# Patient Record
Sex: MALE | Race: BLACK OR AFRICAN AMERICAN | NOT HISPANIC OR LATINO | URBAN - METROPOLITAN AREA
[De-identification: names, ages, dates, MRNs, and addresses within clinical notes are randomized per-mention and may not be internally consistent; named-entity substitution may affect disease eponyms.]

---

## 2017-05-25 ENCOUNTER — EMERGENCY (EMERGENCY)
Facility: HOSPITAL | Age: 27
LOS: 1 days | Discharge: HOME | End: 2017-05-25
Admitting: EMERGENCY MEDICINE
Payer: MEDICAID

## 2017-05-25 VITALS
RESPIRATION RATE: 14 BRPM | WEIGHT: 220.02 LBS | HEIGHT: 75 IN | OXYGEN SATURATION: 98 % | HEART RATE: 92 BPM | TEMPERATURE: 99 F | SYSTOLIC BLOOD PRESSURE: 140 MMHG | DIASTOLIC BLOOD PRESSURE: 98 MMHG

## 2017-05-25 LAB
HIV1 AG SER QL: SIGNIFICANT CHANGE UP
HIV1+2 AB SPEC QL: SIGNIFICANT CHANGE UP

## 2017-05-25 PROCEDURE — 99282 EMERGENCY DEPT VISIT SF MDM: CPT

## 2017-05-25 NOTE — ED PROVIDER NOTE - PROGRESS NOTE DETAILS
HODA Baez; advised patient to see ENT for chronic symptoms may be related to reflux. pt agreees and understands plan. return precautions given.

## 2017-05-25 NOTE — ED PROVIDER NOTE - CARE PLAN
Principal Discharge DX:	Mouth pain  Instructions for follow-up, activity and diet:	Rest, drink plenty of fluids.  Advance activity as tolerated.  Continue all previously prescribed medications as directed. Follow up with PA Admin in 48 hours for test results. [phone]. Follow up with your primary care physician in 48-72 hours- bring copies of your results. Follow up with ENT referral list provided.  Return to the Emergency Department for worsening or persistent symptoms OR ANY NEW OR CONCERNING SYMPTOMS. Principal Discharge DX:	Mouth pain  Instructions for follow-up, activity and diet:	Rest, drink plenty of fluids.  Advance activity as tolerated.  Continue all previously prescribed medications as directed. Follow up with PA Admin in 48 hours for test results. [phone]. Follow up with your primary care physician in 48-72 hours- bring copies of your results. Follow up with ENT referral list provided.  Return to the Emergency Department for worsening or persistent symptoms OR ANY NEW OR CONCERNING SYMPTOMS. Principal Discharge DX:	Mouth pain  Instructions for follow-up, activity and diet:	Rest, drink plenty of fluids.  Advance activity as tolerated.  Continue all previously prescribed medications as directed. Follow up with PA Admin in 48 hours for test results. [phone]. Follow up with your primary care physician in 48-72 hours- bring copies of your results. Follow up with ENT referral list provided.  Return to the Emergency Department for worsening or persistent symptoms OR ANY NEW OR CONCERNING SYMPTOMS.

## 2017-05-25 NOTE — ED PROVIDER NOTE - PHYSICAL EXAMINATION
no vesicles, no tonsillar swelling  no white adhesions, no ulcerations, uvula midline  speaking in full sentences , no drooling no difficulty speaking or swallowing

## 2017-05-25 NOTE — ED PROVIDER NOTE - OBJECTIVE STATEMENT
26 y.o male no pmhx 26 y.o male no pmhx here with complaints of mouth tingling for 4 days with months of feeling a sensation when swallowing. 26 y.o male no pmhx here with complaints of mouth tingling/burning for 4 days with months of feeling a sensation when swallowing. Denies fevers, chills, chest pain, SOB, cough, nausea, vomiting, diarrhea, numbness, weakness, urinary symptoms.

## 2017-05-25 NOTE — ED PROVIDER NOTE - PLAN OF CARE
Rest, drink plenty of fluids.  Advance activity as tolerated.  Continue all previously prescribed medications as directed. Follow up with PA Admin in 48 hours for test results. [phone]. Follow up with your primary care physician in 48-72 hours- bring copies of your results. Follow up with ENT referral list provided.  Return to the Emergency Department for worsening or persistent symptoms OR ANY NEW OR CONCERNING SYMPTOMS.

## 2017-05-25 NOTE — ED PROVIDER NOTE - MEDICAL DECISION MAKING DETAILS
26 y.o male no pmhx with normal exam no signs of thrush /lesions or ulcers. offered HIV testing, accepted. Pt to follow up outpatient with PMD and ENT.

## 2020-04-20 ENCOUNTER — INPATIENT (INPATIENT)
Facility: HOSPITAL | Age: 30
LOS: 4 days | Discharge: HOME | End: 2020-04-25
Attending: INTERNAL MEDICINE | Admitting: INTERNAL MEDICINE
Payer: SELF-PAY

## 2020-04-20 VITALS
SYSTOLIC BLOOD PRESSURE: 143 MMHG | TEMPERATURE: 97 F | WEIGHT: 220.02 LBS | OXYGEN SATURATION: 93 % | HEART RATE: 135 BPM | DIASTOLIC BLOOD PRESSURE: 89 MMHG | RESPIRATION RATE: 20 BRPM | HEIGHT: 74 IN

## 2020-04-20 DIAGNOSIS — R56.9 UNSPECIFIED CONVULSIONS: ICD-10-CM

## 2020-04-20 DIAGNOSIS — E87.6 HYPOKALEMIA: ICD-10-CM

## 2020-04-20 DIAGNOSIS — F10.230 ALCOHOL DEPENDENCE WITH WITHDRAWAL, UNCOMPLICATED: ICD-10-CM

## 2020-04-20 DIAGNOSIS — E83.39 OTHER DISORDERS OF PHOSPHORUS METABOLISM: ICD-10-CM

## 2020-04-20 LAB
ALBUMIN SERPL ELPH-MCNC: 4.6 G/DL — SIGNIFICANT CHANGE UP (ref 3.3–5)
ALP SERPL-CCNC: 55 U/L — SIGNIFICANT CHANGE UP (ref 40–120)
ALT FLD-CCNC: 193 U/L — HIGH (ref 12–78)
ANION GAP SERPL CALC-SCNC: 22 MMOL/L — HIGH (ref 5–17)
AST SERPL-CCNC: 276 U/L — HIGH (ref 15–37)
BASOPHILS # BLD AUTO: 0.02 K/UL — SIGNIFICANT CHANGE UP (ref 0–0.2)
BASOPHILS NFR BLD AUTO: 0.3 % — SIGNIFICANT CHANGE UP (ref 0–2)
BILIRUB SERPL-MCNC: 2 MG/DL — HIGH (ref 0.2–1.2)
BUN SERPL-MCNC: 10 MG/DL — SIGNIFICANT CHANGE UP (ref 7–23)
CALCIUM SERPL-MCNC: 9.9 MG/DL — SIGNIFICANT CHANGE UP (ref 8.5–10.1)
CHLORIDE SERPL-SCNC: 90 MMOL/L — LOW (ref 96–108)
CO2 SERPL-SCNC: 21 MMOL/L — LOW (ref 22–31)
CREAT SERPL-MCNC: 1.24 MG/DL — SIGNIFICANT CHANGE UP (ref 0.5–1.3)
EOSINOPHIL # BLD AUTO: 0 K/UL — SIGNIFICANT CHANGE UP (ref 0–0.5)
EOSINOPHIL NFR BLD AUTO: 0 % — SIGNIFICANT CHANGE UP (ref 0–6)
ETHANOL SERPL-MCNC: <10 MG/DL — SIGNIFICANT CHANGE UP (ref 0–10)
GLUCOSE BLDC GLUCOMTR-MCNC: 127 MG/DL — HIGH (ref 70–99)
GLUCOSE SERPL-MCNC: 121 MG/DL — HIGH (ref 70–99)
HCT VFR BLD CALC: 43.5 % — SIGNIFICANT CHANGE UP (ref 39–50)
HGB BLD-MCNC: 14.9 G/DL — SIGNIFICANT CHANGE UP (ref 13–17)
IMM GRANULOCYTES NFR BLD AUTO: 1.3 % — SIGNIFICANT CHANGE UP (ref 0–1.5)
LYMPHOCYTES # BLD AUTO: 0.32 K/UL — LOW (ref 1–3.3)
LYMPHOCYTES # BLD AUTO: 4.6 % — LOW (ref 13–44)
MAGNESIUM SERPL-MCNC: 3 MG/DL — HIGH (ref 1.6–2.6)
MCHC RBC-ENTMCNC: 34.3 GM/DL — SIGNIFICANT CHANGE UP (ref 32–36)
MCHC RBC-ENTMCNC: 34.6 PG — HIGH (ref 27–34)
MCV RBC AUTO: 100.9 FL — HIGH (ref 80–100)
MONOCYTES # BLD AUTO: 0.47 K/UL — SIGNIFICANT CHANGE UP (ref 0–0.9)
MONOCYTES NFR BLD AUTO: 6.8 % — SIGNIFICANT CHANGE UP (ref 2–14)
NEUTROPHILS # BLD AUTO: 6.04 K/UL — SIGNIFICANT CHANGE UP (ref 1.8–7.4)
NEUTROPHILS NFR BLD AUTO: 87 % — HIGH (ref 43–77)
NRBC # BLD: 0 /100 WBCS — SIGNIFICANT CHANGE UP (ref 0–0)
PHOSPHATE SERPL-MCNC: 1.3 MG/DL — LOW (ref 2.5–4.5)
PLATELET # BLD AUTO: 135 K/UL — LOW (ref 150–400)
POTASSIUM SERPL-MCNC: 2.8 MMOL/L — CRITICAL LOW (ref 3.5–5.3)
POTASSIUM SERPL-SCNC: 2.8 MMOL/L — CRITICAL LOW (ref 3.5–5.3)
PROT SERPL-MCNC: 8.3 GM/DL — SIGNIFICANT CHANGE UP (ref 6–8.3)
RBC # BLD: 4.31 M/UL — SIGNIFICANT CHANGE UP (ref 4.2–5.8)
RBC # FLD: 12 % — SIGNIFICANT CHANGE UP (ref 10.3–14.5)
SARS-COV-2 RNA SPEC QL NAA+PROBE: SIGNIFICANT CHANGE UP
SODIUM SERPL-SCNC: 133 MMOL/L — LOW (ref 135–145)
VIT B12 SERPL-MCNC: 830 PG/ML — SIGNIFICANT CHANGE UP (ref 232–1245)
WBC # BLD: 6.94 K/UL — SIGNIFICANT CHANGE UP (ref 3.8–10.5)
WBC # FLD AUTO: 6.94 K/UL — SIGNIFICANT CHANGE UP (ref 3.8–10.5)

## 2020-04-20 PROCEDURE — 99223 1ST HOSP IP/OBS HIGH 75: CPT

## 2020-04-20 PROCEDURE — 71045 X-RAY EXAM CHEST 1 VIEW: CPT | Mod: 26

## 2020-04-20 PROCEDURE — 93010 ELECTROCARDIOGRAM REPORT: CPT

## 2020-04-20 PROCEDURE — 99285 EMERGENCY DEPT VISIT HI MDM: CPT

## 2020-04-20 PROCEDURE — 70450 CT HEAD/BRAIN W/O DYE: CPT | Mod: 26

## 2020-04-20 RX ORDER — THIAMINE MONONITRATE (VIT B1) 100 MG
100 TABLET ORAL ONCE
Refills: 0 | Status: COMPLETED | OUTPATIENT
Start: 2020-04-20 | End: 2020-04-20

## 2020-04-20 RX ORDER — DIAZEPAM 5 MG
10 TABLET ORAL ONCE
Refills: 0 | Status: DISCONTINUED | OUTPATIENT
Start: 2020-04-20 | End: 2020-04-20

## 2020-04-20 RX ORDER — POTASSIUM CHLORIDE 20 MEQ
40 PACKET (EA) ORAL ONCE
Refills: 0 | Status: DISCONTINUED | OUTPATIENT
Start: 2020-04-20 | End: 2020-04-20

## 2020-04-20 RX ORDER — SODIUM CHLORIDE 9 MG/ML
1000 INJECTION INTRAMUSCULAR; INTRAVENOUS; SUBCUTANEOUS ONCE
Refills: 0 | Status: COMPLETED | OUTPATIENT
Start: 2020-04-20 | End: 2020-04-20

## 2020-04-20 RX ORDER — SODIUM CHLORIDE 9 MG/ML
1000 INJECTION, SOLUTION INTRAVENOUS ONCE
Refills: 0 | Status: COMPLETED | OUTPATIENT
Start: 2020-04-20 | End: 2020-04-20

## 2020-04-20 RX ORDER — POTASSIUM CHLORIDE 20 MEQ
10 PACKET (EA) ORAL
Refills: 0 | Status: COMPLETED | OUTPATIENT
Start: 2020-04-20 | End: 2020-04-20

## 2020-04-20 RX ORDER — POTASSIUM PHOSPHATE, MONOBASIC POTASSIUM PHOSPHATE, DIBASIC 236; 224 MG/ML; MG/ML
15 INJECTION, SOLUTION INTRAVENOUS ONCE
Refills: 0 | Status: COMPLETED | OUTPATIENT
Start: 2020-04-20 | End: 2020-04-21

## 2020-04-20 RX ORDER — HEPARIN SODIUM 5000 [USP'U]/ML
5000 INJECTION INTRAVENOUS; SUBCUTANEOUS EVERY 12 HOURS
Refills: 0 | Status: DISCONTINUED | OUTPATIENT
Start: 2020-04-20 | End: 2020-04-25

## 2020-04-20 RX ORDER — SODIUM CHLORIDE 9 MG/ML
1000 INJECTION, SOLUTION INTRAVENOUS
Refills: 0 | Status: DISCONTINUED | OUTPATIENT
Start: 2020-04-20 | End: 2020-04-25

## 2020-04-20 RX ORDER — POTASSIUM CHLORIDE 20 MEQ
10 PACKET (EA) ORAL ONCE
Refills: 0 | Status: DISCONTINUED | OUTPATIENT
Start: 2020-04-20 | End: 2020-04-20

## 2020-04-20 RX ORDER — SODIUM,POTASSIUM PHOSPHATES 278-250MG
2 POWDER IN PACKET (EA) ORAL ONCE
Refills: 0 | Status: COMPLETED | OUTPATIENT
Start: 2020-04-20 | End: 2020-04-20

## 2020-04-20 RX ORDER — PANTOPRAZOLE SODIUM 20 MG/1
40 TABLET, DELAYED RELEASE ORAL
Refills: 0 | Status: DISCONTINUED | OUTPATIENT
Start: 2020-04-20 | End: 2020-04-25

## 2020-04-20 RX ADMIN — SODIUM CHLORIDE 1000 MILLILITER(S): 9 INJECTION, SOLUTION INTRAVENOUS at 18:24

## 2020-04-20 RX ADMIN — Medication 2 PACKET(S): at 18:55

## 2020-04-20 RX ADMIN — Medication 100 MILLIEQUIVALENT(S): at 21:16

## 2020-04-20 RX ADMIN — Medication 10 MILLIGRAM(S): at 17:37

## 2020-04-20 RX ADMIN — Medication 100 MILLIEQUIVALENT(S): at 18:55

## 2020-04-20 RX ADMIN — Medication 100 MILLIGRAM(S): at 17:35

## 2020-04-20 RX ADMIN — Medication 10 MILLIGRAM(S): at 18:54

## 2020-04-20 RX ADMIN — SODIUM CHLORIDE 1000 MILLILITER(S): 9 INJECTION INTRAMUSCULAR; INTRAVENOUS; SUBCUTANEOUS at 17:35

## 2020-04-20 RX ADMIN — SODIUM CHLORIDE 1000 MILLILITER(S): 9 INJECTION INTRAMUSCULAR; INTRAVENOUS; SUBCUTANEOUS at 19:00

## 2020-04-20 RX ADMIN — Medication 2 MILLIGRAM(S): at 22:50

## 2020-04-20 RX ADMIN — Medication 100 MILLIEQUIVALENT(S): at 19:58

## 2020-04-20 NOTE — ED ADULT TRIAGE NOTE - CHIEF COMPLAINT QUOTE
He had 4 seizures 3 this am  he went to urgent care after he arrived back home He had 1 . Family witness seizures full body tonic clonic movement about 2 to 3 minutes. He is post ictal 20G right hand and was given oxygen. He does drink ETOH daily but has been drinking more. No current medications. No previous Seizure history f/s 153mg/dl. Last drink 2 days ago. He had 4 seizures today: 3 this am  he went to urgent care after he arrived back home He had another one  . Family witness seizures full body tonic clonic movement about 2 to 3 minutes. He is post ictal 20G right hand and was given oxygen. He does drink ETOH often but has been drinking more. feeling stressed. No current medications. No previous Seizure history f/s 153mg/dl. Last drink 2 to 3 days ago.  Tremors of hand noted

## 2020-04-20 NOTE — ED PROVIDER NOTE - OBJECTIVE STATEMENT
30yo male with no sig pmh presents complaining of seizures. States that he had 4 seizures today. First seizure was at 3am, went to urgent care where they said they could not help him and then went home where he had more seizures. When EMS arrived, pt was still post ictal. As per patient, he has never had seizures in the past and has never withdrawn from ETOH. Patient states he has been drinking more because of covid. Reports last drink 2 days ago. Denies hallucinations, dizziness, n/v/d, abdominal pain, focal weakness, SI and other associated sx.

## 2020-04-20 NOTE — H&P ADULT - ASSESSMENT
30 y/o male with no sig pmh presents complaining of seizures. States that he had 4 seizures today. First seizure was at 3am, went to urgent care where they said they could not help him and then went home where he had more seizures. When EMS arrived, pt was still post ictal. As per patient, he has never had seizures in the past and has never withdrawn from ETOH. Patient states he has been drinking more because of covid. Reports last drink 2 days ago. Denies hallucinations, dizziness, n/v/d, abdominal pain, focal weakness, SI and other associated sx. 28 y/o male with no sig pmh presents complaining of seizures. States that he had 4 seizures today. First seizure was at 3am, went to urgent care where they said they could not help him and then went home where he had more seizures. When EMS arrived, pt was still post ictal. As per patient, he has never had seizures in the past and has never withdrawn from ETOH. Patient states he has been drinking more because of covid. Reports last drink 2 days ago. Denies hallucinations, dizziness, n/v/d, abdominal pain, focal weakness, SI and other associated sx. Pt with likely alcohol withdrawal seizures; Dr JOSE Santos, neurology, notified by ED.  IMPROVE VTE Individual Risk Assessment          RISK                                                          Points    [  ] Previous VTE                                                3    [  ] Thrombophilia                                             2    [  ] Lower limb paralysis                                    2        (unable to hold up >15 seconds)      [  ] Current Cancer                                             2         (within 6 months)    [  ] Immobilization > 24 hrs                              1    [  ] ICU/CCU stay > 24 hours                            1    [  ] Age > 60                                                    1    IMPROVE VTE Score ___0______

## 2020-04-20 NOTE — ED ADULT NURSE NOTE - ED STAT RN HANDOFF DETAILS
Report received from CHUYITA Brooks at 7pm. Assessment available on [facility]. will continue to monitor. potassium infusing. awaiting additional potassium infusions

## 2020-04-20 NOTE — H&P ADULT - HISTORY OF PRESENT ILLNESS
30 y/o male with no sig pmh presents complaining of seizures. States that he had 4 seizures today. First seizure was at 3am, went to urgent care where they said they could not help him and then went home where he had more seizures. When EMS arrived, pt was still post ictal. As per patient, he has never had seizures in the past and has never withdrawn from ETOH. Patient states he has been drinking more because of covid. Reports last drink 2 days ago. Denies hallucinations, dizziness, n/v/d, abdominal pain, focal weakness, SI and other associated sx.

## 2020-04-20 NOTE — ED ADULT NURSE NOTE - CHIEF COMPLAINT QUOTE
He had 4 seizures today: 3 this am  he went to urgent care after he arrived back home He had another one  . Family witness seizures full body tonic clonic movement about 2 to 3 minutes. He is post ictal 20G right hand and was given oxygen. He does drink ETOH often but has been drinking more. feeling stressed. No current medications. No previous Seizure history f/s 153mg/dl. Last drink 2 to 3 days ago.  Tremors of hand noted

## 2020-04-20 NOTE — H&P ADULT - NSHPPHYSICALEXAM_GEN_ALL_CORE
T(C): 36.8 (20 Apr 2020 19:59), Max: 36.8 (20 Apr 2020 19:59)  T(F): 98.2 (20 Apr 2020 19:59), Max: 98.2 (20 Apr 2020 19:59)  HR: 125 (20 Apr 2020 19:59) (125 - 135)  BP: 153/87 (20 Apr 2020 19:59) (143/89 - 153/87)  BP(mean): --  RR: 20 (20 Apr 2020 19:59) (20 - 20)  SpO2: 95% (20 Apr 2020 19:59) (93% - 95%)    PHYSICAL EXAM:  GENERAL: NAD, well-groomed, well-developed  HEAD:  Atraumatic, Normocephalic  EYES: EOMI, PERRLA, conjunctiva and sclera clear  ENMT: No tonsillar erythema, exudates, or enlargement; Moist mucous membranes,  No lesions  NECK: Supple, No JVD, Normal thyroid  NERVOUS SYSTEM:  Alert & Oriented X3, Good concentration; Motor Strength 5/5 B/L upper and lower extremities; DTRs 2+ intact and symmetric, + tremor  CHEST/LUNG: Clear to percussion bilaterally; No rales, rhonchi, wheezing, or rubs  HEART: Regular rate and rhythm; No murmurs, rubs, or gallops  ABDOMEN: Soft, Nontender, Nondistended; Bowel sounds present  EXTREMITIES:  + Peripheral Pulses, No clubbing, cyanosis, or edema  LYMPH: No lymphadenopathy noted  SKIN: No rashes or lesions

## 2020-04-20 NOTE — ED PROVIDER NOTE - CLINICAL SUMMARY MEDICAL DECISION MAKING FREE TEXT BOX
28yo male with no sig pmh presents complaining of seizures 2/2 ETOH withdrawal. Patient is tremulous, tachycardic but neurologically intact. Will get labs, ct head, cxr, ekg and admit.

## 2020-04-20 NOTE — H&P ADULT - NSHPLABSRESULTS_GEN_ALL_CORE
LABS:                        14.9   6.94  )-----------( 135      ( 20 Apr 2020 17:48 )             43.5     04-20    133<L>  |  90<L>  |  10  ----------------------------<  121<H>  2.8<LL>   |  21<L>  |  1.24    Ca    9.9      20 Apr 2020 17:48  Phos  1.3     04-20  Mg     3.0     04-20    TPro  8.3  /  Alb  4.6  /  TBili  2.0<H>  /  DBili  x   /  AST  276<H>  /  ALT  193<H>  /  AlkPhos  55  04-20        CAPILLARY BLOOD GLUCOSE      POCT Blood Glucose.: 127 mg/dL (20 Apr 2020 17:18)  COVID-19 PCR: NotDetec: This test has been validated by [employer] to be accurate;  though it has not been FDA cleared/approved by the usual pathway  As with all laboratory test, results should be correlated with clinical  findings.  https://www.fda.gov/media/153905/download  https://www.fda.gov/media/682752/download (04.20.20 @ 17:48)          RADIOLOGY & ADDITIONAL TESTS:  < from: CT Head No Cont (04.20.20 @ 18:38) >      IMPRESSION:     No acute intracranial findings.    < end of copied text >    < from: Xray Chest 1 View-PORTABLE IMMEDIATE (04.20.20 @ 18:15) >    IMPRESSION: Clear lungs.    < end of copied text >      Imaging Personally Reviewed:  [ ] YES  [ ] NO LABS:                        14.9   6.94  )-----------( 135      ( 20 Apr 2020 17:48 )             43.5     04-20    133<L>  |  90<L>  |  10  ----------------------------<  121<H>  2.8<LL>   |  21<L>  |  1.24    Ca    9.9      20 Apr 2020 17:48  Phos  1.3     04-20  Mg     3.0     04-20    TPro  8.3  /  Alb  4.6  /  TBili  2.0<H>  /  DBili  x   /  AST  276<H>  /  ALT  193<H>  /  AlkPhos  55  04-20        CAPILLARY BLOOD GLUCOSE      POCT Blood Glucose.: 127 mg/dL (20 Apr 2020 17:18)  COVID-19 PCR: NotDetec: This test has been validated by [employer] to be accurate;  though it has not been FDA cleared/approved by the usual pathway  As with all laboratory test, results should be correlated with clinical  Alcohol, Blood: <10: TOXIC CONCENTRATIONS (mg/dL):  Flushing, Slowing of  Reflexes, Impaired Visual Acuity:     Depression of CNS:    > 100  Fatalities Reported:       > 400  Results reported as a "< number" are below reliably detectable limits and  considered negative  These ranges are intended as general guidelines.  Alcohol metabolism can vary widely among individuals.  This test  is approved for clinical and not for forensic purposes. mg/dL (04.20.20 @ 17:48)          RADIOLOGY & ADDITIONAL TESTS:  < from: CT Head No Cont (04.20.20 @ 18:38) >      IMPRESSION:     No acute intracranial findings.    < end of copied text >    < from: Xray Chest 1 View-PORTABLE IMMEDIATE (04.20.20 @ 18:15) >    IMPRESSION: Clear lungs.    < end of copied text >      Imaging Personally Reviewed:  [x ] YES  [ ] NO

## 2020-04-20 NOTE — ED ADULT NURSE NOTE - OBJECTIVE STATEMENT
Pt is a 29YOM who is here for seizures, pt has been an every day drinker for 3 weeks and 3 days ago, he stopped drinking as much as normal and had a seizure, pt states he had 3 seizures, one witnessed by family that lasted 2-3 minutes, pt has been in alcohol withdrawal for several days. Pt denies any recent illness, denies any nausea or vomiting.

## 2020-04-20 NOTE — ED PROVIDER NOTE - ATTENDING CONTRIBUTION TO CARE
Agree with HODA Garibay    In brief, 29M hx of etoh abuse pw 4 seizures today after stopping drinking 2 days ago. On exam, tremulous, tachycardic and slightly confused. ETOH withdrawal vs DTs. Start fluids and benzos. Admit.    I read ekg as sinus tach rate 124, no st elevation or depression, qtc 465, narrow qrs, normal axis.

## 2020-04-20 NOTE — H&P ADULT - NSHPREVIEWOFSYSTEMS_GEN_ALL_CORE
REVIEW OF SYSTEMS:  CONSTITUTIONAL: No fever, weight loss, or fatigue  EYES: No eye pain, visual disturbances, or discharge  ENMT:  No difficulty hearing, tinnitus, vertigo; No sinus or throat pain  NECK: No pain or stiffness  RESPIRATORY: No cough, wheezing, chills or hemoptysis; No shortness of breath  CARDIOVASCULAR: No chest pain, palpitations, dizziness, or leg swelling  GASTROINTESTINAL: No abdominal or epigastric pain. No nausea, vomiting, or hematemesis; No diarrhea or constipation. No melena or hematochezia.  GENITOURINARY: No dysuria, frequency, hematuria, or incontinence  NEUROLOGICAL: No headaches, memory loss, loss of strength, numbness, + tremors, + seizures  SKIN: No itching, burning, rashes, or lesions   LYMPH NODES: No enlarged glands  ENDOCRINE: No heat or cold intolerance; No hair loss  MUSCULOSKELETAL: No joint pain or swelling; No muscle, back, or extremity pain  PSYCHIATRIC: No depression, anxiety, mood swings, or difficulty sleeping  HEME/LYMPH: No easy bruising, or bleeding gums  ALLERY AND IMMUNOLOGIC: No hives or eczema

## 2020-04-21 LAB
ALBUMIN SERPL ELPH-MCNC: 3.6 G/DL — SIGNIFICANT CHANGE UP (ref 3.3–5)
ALP SERPL-CCNC: 47 U/L — SIGNIFICANT CHANGE UP (ref 40–120)
ALT FLD-CCNC: 157 U/L — HIGH (ref 12–78)
ANION GAP SERPL CALC-SCNC: 11 MMOL/L — SIGNIFICANT CHANGE UP (ref 5–17)
ANION GAP SERPL CALC-SCNC: 13 MMOL/L — SIGNIFICANT CHANGE UP (ref 5–17)
AST SERPL-CCNC: 307 U/L — HIGH (ref 15–37)
BILIRUB DIRECT SERPL-MCNC: 0.73 MG/DL — HIGH (ref 0.05–0.2)
BILIRUB INDIRECT FLD-MCNC: 1.7 MG/DL — HIGH (ref 0.2–1)
BILIRUB SERPL-MCNC: 2.4 MG/DL — HIGH (ref 0.2–1.2)
BUN SERPL-MCNC: 10 MG/DL — SIGNIFICANT CHANGE UP (ref 7–23)
BUN SERPL-MCNC: 9 MG/DL — SIGNIFICANT CHANGE UP (ref 7–23)
CALCIUM SERPL-MCNC: 9 MG/DL — SIGNIFICANT CHANGE UP (ref 8.5–10.1)
CALCIUM SERPL-MCNC: 9 MG/DL — SIGNIFICANT CHANGE UP (ref 8.5–10.1)
CHLORIDE SERPL-SCNC: 102 MMOL/L — SIGNIFICANT CHANGE UP (ref 96–108)
CHLORIDE SERPL-SCNC: 103 MMOL/L — SIGNIFICANT CHANGE UP (ref 96–108)
CO2 SERPL-SCNC: 25 MMOL/L — SIGNIFICANT CHANGE UP (ref 22–31)
CO2 SERPL-SCNC: 28 MMOL/L — SIGNIFICANT CHANGE UP (ref 22–31)
CREAT SERPL-MCNC: 1.07 MG/DL — SIGNIFICANT CHANGE UP (ref 0.5–1.3)
CREAT SERPL-MCNC: 1.21 MG/DL — SIGNIFICANT CHANGE UP (ref 0.5–1.3)
GLUCOSE SERPL-MCNC: 69 MG/DL — LOW (ref 70–99)
GLUCOSE SERPL-MCNC: 75 MG/DL — SIGNIFICANT CHANGE UP (ref 70–99)
MAGNESIUM SERPL-MCNC: 2.6 MG/DL — SIGNIFICANT CHANGE UP (ref 1.6–2.6)
PHOSPHATE SERPL-MCNC: 2.8 MG/DL — SIGNIFICANT CHANGE UP (ref 2.5–4.5)
POTASSIUM SERPL-MCNC: 2.7 MMOL/L — CRITICAL LOW (ref 3.5–5.3)
POTASSIUM SERPL-MCNC: 2.8 MMOL/L — CRITICAL LOW (ref 3.5–5.3)
POTASSIUM SERPL-SCNC: 2.7 MMOL/L — CRITICAL LOW (ref 3.5–5.3)
POTASSIUM SERPL-SCNC: 2.8 MMOL/L — CRITICAL LOW (ref 3.5–5.3)
PROT SERPL-MCNC: 7.5 GM/DL — SIGNIFICANT CHANGE UP (ref 6–8.3)
SODIUM SERPL-SCNC: 141 MMOL/L — SIGNIFICANT CHANGE UP (ref 135–145)
SODIUM SERPL-SCNC: 141 MMOL/L — SIGNIFICANT CHANGE UP (ref 135–145)

## 2020-04-21 PROCEDURE — 93010 ELECTROCARDIOGRAM REPORT: CPT

## 2020-04-21 PROCEDURE — 99233 SBSQ HOSP IP/OBS HIGH 50: CPT

## 2020-04-21 RX ORDER — FOLIC ACID 0.8 MG
1 TABLET ORAL DAILY
Refills: 0 | Status: DISCONTINUED | OUTPATIENT
Start: 2020-04-21 | End: 2020-04-25

## 2020-04-21 RX ORDER — THIAMINE MONONITRATE (VIT B1) 100 MG
100 TABLET ORAL DAILY
Refills: 0 | Status: DISCONTINUED | OUTPATIENT
Start: 2020-04-21 | End: 2020-04-25

## 2020-04-21 RX ORDER — ACETAMINOPHEN 500 MG
650 TABLET ORAL EVERY 6 HOURS
Refills: 0 | Status: DISCONTINUED | OUTPATIENT
Start: 2020-04-21 | End: 2020-04-25

## 2020-04-21 RX ORDER — POTASSIUM CHLORIDE 20 MEQ
40 PACKET (EA) ORAL EVERY 4 HOURS
Refills: 0 | Status: COMPLETED | OUTPATIENT
Start: 2020-04-21 | End: 2020-04-21

## 2020-04-21 RX ADMIN — Medication 100 MILLIGRAM(S): at 15:39

## 2020-04-21 RX ADMIN — Medication 650 MILLIGRAM(S): at 17:45

## 2020-04-21 RX ADMIN — HEPARIN SODIUM 5000 UNIT(S): 5000 INJECTION INTRAVENOUS; SUBCUTANEOUS at 05:54

## 2020-04-21 RX ADMIN — Medication 650 MILLIGRAM(S): at 01:14

## 2020-04-21 RX ADMIN — Medication 2 MILLIGRAM(S): at 05:54

## 2020-04-21 RX ADMIN — SODIUM CHLORIDE 125 MILLILITER(S): 9 INJECTION, SOLUTION INTRAVENOUS at 01:08

## 2020-04-21 RX ADMIN — Medication 2 MILLIGRAM(S): at 15:39

## 2020-04-21 RX ADMIN — Medication 40 MILLIEQUIVALENT(S): at 17:32

## 2020-04-21 RX ADMIN — PANTOPRAZOLE SODIUM 40 MILLIGRAM(S): 20 TABLET, DELAYED RELEASE ORAL at 05:55

## 2020-04-21 RX ADMIN — SODIUM CHLORIDE 125 MILLILITER(S): 9 INJECTION, SOLUTION INTRAVENOUS at 17:36

## 2020-04-21 RX ADMIN — Medication 2 MILLIGRAM(S): at 17:32

## 2020-04-21 RX ADMIN — Medication 2 MILLIGRAM(S): at 01:41

## 2020-04-21 RX ADMIN — HEPARIN SODIUM 5000 UNIT(S): 5000 INJECTION INTRAVENOUS; SUBCUTANEOUS at 17:32

## 2020-04-21 RX ADMIN — Medication 2 MILLIGRAM(S): at 12:16

## 2020-04-21 RX ADMIN — Medication 1 TABLET(S): at 15:39

## 2020-04-21 RX ADMIN — Medication 1 MILLIGRAM(S): at 15:39

## 2020-04-21 RX ADMIN — POTASSIUM PHOSPHATE, MONOBASIC POTASSIUM PHOSPHATE, DIBASIC 62.5 MILLIMOLE(S): 236; 224 INJECTION, SOLUTION INTRAVENOUS at 07:00

## 2020-04-21 RX ADMIN — Medication 40 MILLIEQUIVALENT(S): at 23:53

## 2020-04-21 RX ADMIN — Medication 40 MILLIEQUIVALENT(S): at 15:39

## 2020-04-21 NOTE — CONSULT NOTE ADULT - ASSESSMENT
Alcohol withdrawal  Delirium Tremens    - EEG  - continue alcohol withdrawal protocol.  - neuro stable and non-focal  - no need for AED at this time.     Thank you for the consult.

## 2020-04-21 NOTE — SBIRT NOTE ADULT - NSSBIRTBRIEFINTDET_GEN_A_CORE
RUTH discussed with p about healthy guidelines that should be followed during an episode of ETOH abuse  Offered to pt services in the community-[employer] or AA meetings.  Pt declines any assistance from SW will do it on his own

## 2020-04-21 NOTE — CONSULT NOTE ADULT - SUBJECTIVE AND OBJECTIVE BOX
HPI: 29 year old man with no PMHx presenting with tremors. As per patient he has been having tremors an his whole body is shaking. He denies every having LOC or confusion. He states he fell from shaking all over his body. As per chart, it is documented as seizure like activity. His last drink of alcohol was on 4/18/20. He denies ever having seizures in the past. No LOC or head trauma in the past.     PMHx: none  PSHx: None  FHx: None  Allergies: NKDA  Meds: See EMR  ROS: tremors  Social Hx: Non-smoker, drinks 1 pint of vodka daily since 2017. No illicit drug use    Vitals: Temp 99.6F   RR 16      /105  General: NAD  Neuro Exam: AOx3. Follows commands. No dysarthria. no aphasia. EOM intact. No facial droop. PERRL. VFF. Tongue is midline. Palate elevates symmetrically. Shoulder shrug is intact. Moving all four extremities. No focal weakness. Finger to nose and heel to shin intact. Reflexes symmetric and toes down. Gait exam deferred. Postural tremor in both hands is mild.     CT head and labs reviewed

## 2020-04-22 LAB
ALBUMIN SERPL ELPH-MCNC: 3.2 G/DL — LOW (ref 3.3–5)
ALP SERPL-CCNC: 38 U/L — LOW (ref 40–120)
ALT FLD-CCNC: 122 U/L — HIGH (ref 12–78)
ANION GAP SERPL CALC-SCNC: 13 MMOL/L — SIGNIFICANT CHANGE UP (ref 5–17)
ANION GAP SERPL CALC-SCNC: 14 MMOL/L — SIGNIFICANT CHANGE UP (ref 5–17)
AST SERPL-CCNC: 290 U/L — HIGH (ref 15–37)
BILIRUB SERPL-MCNC: 2 MG/DL — HIGH (ref 0.2–1.2)
BUN SERPL-MCNC: 8 MG/DL — SIGNIFICANT CHANGE UP (ref 7–23)
BUN SERPL-MCNC: 8 MG/DL — SIGNIFICANT CHANGE UP (ref 7–23)
CALCIUM SERPL-MCNC: 8.6 MG/DL — SIGNIFICANT CHANGE UP (ref 8.5–10.1)
CALCIUM SERPL-MCNC: 9.1 MG/DL — SIGNIFICANT CHANGE UP (ref 8.5–10.1)
CHLORIDE SERPL-SCNC: 101 MMOL/L — SIGNIFICANT CHANGE UP (ref 96–108)
CHLORIDE SERPL-SCNC: 104 MMOL/L — SIGNIFICANT CHANGE UP (ref 96–108)
CO2 SERPL-SCNC: 24 MMOL/L — SIGNIFICANT CHANGE UP (ref 22–31)
CO2 SERPL-SCNC: 24 MMOL/L — SIGNIFICANT CHANGE UP (ref 22–31)
CREAT SERPL-MCNC: 0.86 MG/DL — SIGNIFICANT CHANGE UP (ref 0.5–1.3)
CREAT SERPL-MCNC: 0.87 MG/DL — SIGNIFICANT CHANGE UP (ref 0.5–1.3)
GLUCOSE SERPL-MCNC: 69 MG/DL — LOW (ref 70–99)
GLUCOSE SERPL-MCNC: 82 MG/DL — SIGNIFICANT CHANGE UP (ref 70–99)
HCT VFR BLD CALC: 40.3 % — SIGNIFICANT CHANGE UP (ref 39–50)
HGB BLD-MCNC: 13.4 G/DL — SIGNIFICANT CHANGE UP (ref 13–17)
MAGNESIUM SERPL-MCNC: 2.1 MG/DL — SIGNIFICANT CHANGE UP (ref 1.6–2.6)
MCHC RBC-ENTMCNC: 33.3 GM/DL — SIGNIFICANT CHANGE UP (ref 32–36)
MCHC RBC-ENTMCNC: 35 PG — HIGH (ref 27–34)
MCV RBC AUTO: 105.2 FL — HIGH (ref 80–100)
NRBC # BLD: 0 /100 WBCS — SIGNIFICANT CHANGE UP (ref 0–0)
PHOSPHATE SERPL-MCNC: 3.6 MG/DL — SIGNIFICANT CHANGE UP (ref 2.5–4.5)
PLATELET # BLD AUTO: 98 K/UL — LOW (ref 150–400)
POTASSIUM SERPL-MCNC: 3 MMOL/L — LOW (ref 3.5–5.3)
POTASSIUM SERPL-MCNC: 3.4 MMOL/L — LOW (ref 3.5–5.3)
POTASSIUM SERPL-SCNC: 3 MMOL/L — LOW (ref 3.5–5.3)
POTASSIUM SERPL-SCNC: 3.4 MMOL/L — LOW (ref 3.5–5.3)
PROT SERPL-MCNC: 6.3 GM/DL — SIGNIFICANT CHANGE UP (ref 6–8.3)
RBC # BLD: 3.83 M/UL — LOW (ref 4.2–5.8)
RBC # FLD: 12.2 % — SIGNIFICANT CHANGE UP (ref 10.3–14.5)
SODIUM SERPL-SCNC: 138 MMOL/L — SIGNIFICANT CHANGE UP (ref 135–145)
SODIUM SERPL-SCNC: 142 MMOL/L — SIGNIFICANT CHANGE UP (ref 135–145)
WBC # BLD: 4.03 K/UL — SIGNIFICANT CHANGE UP (ref 3.8–10.5)
WBC # FLD AUTO: 4.03 K/UL — SIGNIFICANT CHANGE UP (ref 3.8–10.5)

## 2020-04-22 PROCEDURE — 99232 SBSQ HOSP IP/OBS MODERATE 35: CPT

## 2020-04-22 RX ORDER — POTASSIUM CHLORIDE 20 MEQ
40 PACKET (EA) ORAL EVERY 4 HOURS
Refills: 0 | Status: DISCONTINUED | OUTPATIENT
Start: 2020-04-22 | End: 2020-04-22

## 2020-04-22 RX ORDER — POTASSIUM CHLORIDE 20 MEQ
40 PACKET (EA) ORAL ONCE
Refills: 0 | Status: COMPLETED | OUTPATIENT
Start: 2020-04-22 | End: 2020-04-22

## 2020-04-22 RX ADMIN — HEPARIN SODIUM 5000 UNIT(S): 5000 INJECTION INTRAVENOUS; SUBCUTANEOUS at 17:24

## 2020-04-22 RX ADMIN — Medication 100 MILLIGRAM(S): at 11:53

## 2020-04-22 RX ADMIN — Medication 1.5 MILLIGRAM(S): at 17:27

## 2020-04-22 RX ADMIN — SODIUM CHLORIDE 125 MILLILITER(S): 9 INJECTION, SOLUTION INTRAVENOUS at 04:05

## 2020-04-22 RX ADMIN — PANTOPRAZOLE SODIUM 40 MILLIGRAM(S): 20 TABLET, DELAYED RELEASE ORAL at 06:06

## 2020-04-22 RX ADMIN — Medication 40 MILLIEQUIVALENT(S): at 17:27

## 2020-04-22 RX ADMIN — Medication 1 TABLET(S): at 11:53

## 2020-04-22 RX ADMIN — Medication 1.5 MILLIGRAM(S): at 12:03

## 2020-04-22 RX ADMIN — Medication 40 MILLIEQUIVALENT(S): at 11:53

## 2020-04-22 RX ADMIN — Medication 1.5 MILLIGRAM(S): at 05:39

## 2020-04-22 RX ADMIN — Medication 1.5 MILLIGRAM(S): at 00:13

## 2020-04-22 RX ADMIN — Medication 1 MILLIGRAM(S): at 11:53

## 2020-04-22 RX ADMIN — HEPARIN SODIUM 5000 UNIT(S): 5000 INJECTION INTRAVENOUS; SUBCUTANEOUS at 05:39

## 2020-04-23 DIAGNOSIS — Z29.9 ENCOUNTER FOR PROPHYLACTIC MEASURES, UNSPECIFIED: ICD-10-CM

## 2020-04-23 LAB
ANION GAP SERPL CALC-SCNC: 12 MMOL/L — SIGNIFICANT CHANGE UP (ref 5–17)
ANION GAP SERPL CALC-SCNC: 14 MMOL/L — SIGNIFICANT CHANGE UP (ref 5–17)
BUN SERPL-MCNC: 7 MG/DL — SIGNIFICANT CHANGE UP (ref 7–23)
BUN SERPL-MCNC: 8 MG/DL — SIGNIFICANT CHANGE UP (ref 7–23)
CALCIUM SERPL-MCNC: 8.9 MG/DL — SIGNIFICANT CHANGE UP (ref 8.5–10.1)
CALCIUM SERPL-MCNC: 9.1 MG/DL — SIGNIFICANT CHANGE UP (ref 8.5–10.1)
CHLORIDE SERPL-SCNC: 103 MMOL/L — SIGNIFICANT CHANGE UP (ref 96–108)
CHLORIDE SERPL-SCNC: 105 MMOL/L — SIGNIFICANT CHANGE UP (ref 96–108)
CO2 SERPL-SCNC: 24 MMOL/L — SIGNIFICANT CHANGE UP (ref 22–31)
CO2 SERPL-SCNC: 26 MMOL/L — SIGNIFICANT CHANGE UP (ref 22–31)
CREAT SERPL-MCNC: 0.74 MG/DL — SIGNIFICANT CHANGE UP (ref 0.5–1.3)
CREAT SERPL-MCNC: 0.83 MG/DL — SIGNIFICANT CHANGE UP (ref 0.5–1.3)
GLUCOSE SERPL-MCNC: 71 MG/DL — SIGNIFICANT CHANGE UP (ref 70–99)
GLUCOSE SERPL-MCNC: 90 MG/DL — SIGNIFICANT CHANGE UP (ref 70–99)
POTASSIUM SERPL-MCNC: 3.2 MMOL/L — LOW (ref 3.5–5.3)
POTASSIUM SERPL-MCNC: 3.8 MMOL/L — SIGNIFICANT CHANGE UP (ref 3.5–5.3)
POTASSIUM SERPL-SCNC: 3.2 MMOL/L — LOW (ref 3.5–5.3)
POTASSIUM SERPL-SCNC: 3.8 MMOL/L — SIGNIFICANT CHANGE UP (ref 3.5–5.3)
SODIUM SERPL-SCNC: 141 MMOL/L — SIGNIFICANT CHANGE UP (ref 135–145)
SODIUM SERPL-SCNC: 143 MMOL/L — SIGNIFICANT CHANGE UP (ref 135–145)

## 2020-04-23 PROCEDURE — 99232 SBSQ HOSP IP/OBS MODERATE 35: CPT

## 2020-04-23 RX ORDER — POTASSIUM CHLORIDE 20 MEQ
40 PACKET (EA) ORAL EVERY 4 HOURS
Refills: 0 | Status: COMPLETED | OUTPATIENT
Start: 2020-04-23 | End: 2020-04-23

## 2020-04-23 RX ADMIN — Medication 100 MILLIGRAM(S): at 11:26

## 2020-04-23 RX ADMIN — Medication 1 MILLIGRAM(S): at 17:43

## 2020-04-23 RX ADMIN — SODIUM CHLORIDE 125 MILLILITER(S): 9 INJECTION, SOLUTION INTRAVENOUS at 21:53

## 2020-04-23 RX ADMIN — Medication 1 MILLIGRAM(S): at 11:26

## 2020-04-23 RX ADMIN — Medication 40 MILLIEQUIVALENT(S): at 10:16

## 2020-04-23 RX ADMIN — Medication 1.5 MILLIGRAM(S): at 05:23

## 2020-04-23 RX ADMIN — SODIUM CHLORIDE 125 MILLILITER(S): 9 INJECTION, SOLUTION INTRAVENOUS at 05:23

## 2020-04-23 RX ADMIN — Medication 1 MILLIGRAM(S): at 13:20

## 2020-04-23 RX ADMIN — HEPARIN SODIUM 5000 UNIT(S): 5000 INJECTION INTRAVENOUS; SUBCUTANEOUS at 05:23

## 2020-04-23 RX ADMIN — Medication 40 MILLIEQUIVALENT(S): at 13:20

## 2020-04-23 RX ADMIN — Medication 1 MILLIGRAM(S): at 21:53

## 2020-04-23 RX ADMIN — Medication 1 MILLIGRAM(S): at 10:16

## 2020-04-23 RX ADMIN — Medication 1 TABLET(S): at 11:26

## 2020-04-23 RX ADMIN — HEPARIN SODIUM 5000 UNIT(S): 5000 INJECTION INTRAVENOUS; SUBCUTANEOUS at 17:43

## 2020-04-23 RX ADMIN — PANTOPRAZOLE SODIUM 40 MILLIGRAM(S): 20 TABLET, DELAYED RELEASE ORAL at 05:23

## 2020-04-23 RX ADMIN — Medication 1.5 MILLIGRAM(S): at 00:22

## 2020-04-24 PROCEDURE — 99232 SBSQ HOSP IP/OBS MODERATE 35: CPT

## 2020-04-24 RX ORDER — FOLIC ACID 0.8 MG
1 TABLET ORAL
Qty: 30 | Refills: 0
Start: 2020-04-24 | End: 2020-05-23

## 2020-04-24 RX ORDER — THIAMINE MONONITRATE (VIT B1) 100 MG
1 TABLET ORAL
Qty: 30 | Refills: 0
Start: 2020-04-24 | End: 2020-05-23

## 2020-04-24 RX ORDER — SODIUM CHLORIDE 9 MG/ML
1000 INJECTION INTRAMUSCULAR; INTRAVENOUS; SUBCUTANEOUS
Refills: 0 | Status: COMPLETED | OUTPATIENT
Start: 2020-04-24 | End: 2020-04-24

## 2020-04-24 RX ADMIN — Medication 1 TABLET(S): at 12:47

## 2020-04-24 RX ADMIN — Medication 0.5 MILLIGRAM(S): at 06:28

## 2020-04-24 RX ADMIN — SODIUM CHLORIDE 1000 MILLILITER(S): 9 INJECTION INTRAMUSCULAR; INTRAVENOUS; SUBCUTANEOUS at 14:55

## 2020-04-24 RX ADMIN — HEPARIN SODIUM 5000 UNIT(S): 5000 INJECTION INTRAVENOUS; SUBCUTANEOUS at 17:48

## 2020-04-24 RX ADMIN — SODIUM CHLORIDE 125 MILLILITER(S): 9 INJECTION, SOLUTION INTRAVENOUS at 17:47

## 2020-04-24 RX ADMIN — Medication 1 MILLIGRAM(S): at 12:48

## 2020-04-24 RX ADMIN — Medication 0.5 MILLIGRAM(S): at 02:25

## 2020-04-24 RX ADMIN — Medication 0.5 MILLIGRAM(S): at 09:42

## 2020-04-24 RX ADMIN — PANTOPRAZOLE SODIUM 40 MILLIGRAM(S): 20 TABLET, DELAYED RELEASE ORAL at 06:28

## 2020-04-24 RX ADMIN — HEPARIN SODIUM 5000 UNIT(S): 5000 INJECTION INTRAVENOUS; SUBCUTANEOUS at 06:28

## 2020-04-24 RX ADMIN — Medication 100 MILLIGRAM(S): at 12:48

## 2020-04-24 RX ADMIN — SODIUM CHLORIDE 125 MILLILITER(S): 9 INJECTION, SOLUTION INTRAVENOUS at 06:28

## 2020-04-24 NOTE — PROGRESS NOTE ADULT - PROBLEM SELECTOR PLAN 2
- suppl Potassium given  - follow up repeat BMP.
IV fluids, CIWA protocol, monitor and replace vitamins and electrolytes   thiamine, folic acid and mvi
-IV fluids, CIWA protocol,    -cont thiamine, folic acid and mvi  - Ativan as needed.
- resolved after suppl Potassium given

## 2020-04-24 NOTE — DISCHARGE NOTE PROVIDER - HOSPITAL COURSE
30 y/o male with no sig pmh presents complaining of seizures. States that he had 4 seizures today. First seizure was at 3am, went to urgent care where they said they could not help him and then went home where he had more seizures. When EMS arrived, pt was still post ictal. As per patient, he has never had seizures in the past and has never withdrawn from ETOH. Patient states he has been drinking more because of covid. Reports last drink 2 days ago. Denies hallucinations, dizziness, n/v/d, abdominal pain, focal weakness, SI and other associated sx.  He was admitted for seizure, and ETOH withdrawal, evaluated by neurology, and EEG was negative for seizure, no need for AED( anti epileptic drug) as per neurology.   He is cleared by neurology for discharge.   Patient was instructed to stop drinking alcohol and he verbalizes understanding.        It took 35 minutes to discharge the patient.

## 2020-04-24 NOTE — DISCHARGE NOTE PROVIDER - CARE PROVIDER_API CALL
Shante Santos (DO)  Neurology  [facility] [place] [address]  Phone: [phone]  Fax: [phone]  Follow Up Time: 1 week    PCP in one week,   Phone: (   )    -  Fax: (   )    -  Follow Up Time:

## 2020-04-24 NOTE — PROGRESS NOTE ADULT - SUBJECTIVE AND OBJECTIVE BOX
Neurology Progress Note    No acute events.     Neuro Exam: AOx2. Follows commands. No facial droop. Positional tremor improved. Moving all four extremities.     EEG- normal    A/P:  Alcohol withdrawal  Hypokalemia- improving    - EEG is normal  - continue alcohol withdrawal protocol.  - neuro stable and non-focal  - no need for AED at this time.   - D/C planning  - will sign off.
Neurology Progress Note    No acute events.     Neuro Exam: AOx2. Follows commands. No facial droop. Positional tremor improved. Moving all four extremities.     EEG- normal    A/P:  Alcohol withdrawal  Hypokalemia- improving    - EEG is normal  - continue alcohol withdrawal protocol.  - neuro stable and non-focal  - no need for AED at this time.   - D/C planning
Neurology Progress Note    No acute events. Confused at times.     Neuro Exam: AOx2. Follows commands. No facial droop. Tremulous at times. Moving all four extremities.     EEG- report pending    A/P:  Alcohol withdrawal  Hypokalemia- improving    - EEG report pending  - continue alcohol withdrawal protocol.  - neuro stable and non-focal  - no need for AED at this time.
Patient is a 29y old  Male who presents with a chief complaint of Alcohol abuse, seizure. (23 Apr 2020 12:41), no headache, no dizziness.    MEDICATIONS  (STANDING):  folic acid 1 milliGRAM(s) Oral daily  heparin   Injectable 5000 Unit(s) SubCutaneous every 12 hours  lactated ringers. 1000 milliLiter(s) (125 mL/Hr) IV Continuous <Continuous>  LORazepam   Injectable 1 milliGRAM(s) IV Push every 4 hours  LORazepam   Injectable   IV Push   multivitamin 1 Tablet(s) Oral daily  pantoprazole    Tablet 40 milliGRAM(s) Oral before breakfast  thiamine 100 milliGRAM(s) Oral daily    MEDICATIONS  (PRN):  acetaminophen   Tablet .. 650 milliGRAM(s) Oral every 6 hours PRN Temp greater or equal to 38C (100.4F), Mild Pain (1 - 3)        REVIEW OF SYSTEMS:  Reviewed and negative.     Vital Signs Last 24 Hrs  T(C): 37.1 (23 Apr 2020 10:13), Max: 37.8 (22 Apr 2020 20:52)  T(F): 98.7 (23 Apr 2020 10:13), Max: 100 (22 Apr 2020 20:52)  HR: 75 (23 Apr 2020 10:13) (75 - 94)  BP: 131/86 (23 Apr 2020 10:13) (131/86 - 144/99)  BP(mean): --  RR: 18 (23 Apr 2020 10:13) (18 - 19)  SpO2: 99% (23 Apr 2020 10:13) (98% - 100%)    PHYSICAL EXAM:  GENERAL: NAD, well-groomed, well-developed  HEAD:  Atraumatic, Normocephalic  EYES: EOMI, PERRLA, conjunctiva and sclera clear  ENMT: No tonsillar erythema, exudates, or enlargement; Moist mucous membranes   NECK: Supple, No JVD   NERVOUS SYSTEM:  Alert & Oriented X3, Good concentration; Motor Strength 5/5 B/L upper and lower extremities; DTRs 2+ intact and symmetric  CHEST/LUNG: Clear to auscultation  bilaterally; No rales, rhonchi, wheezing, or rubs  HEART: Regular rate and rhythm; No murmurs, rubs, or gallops  ABDOMEN: Soft, Nontender, Nondistended; Bowel sounds present  EXTREMITIES:  2+ Peripheral Pulses, No clubbing, cyanosis, or edema  LYMPH: No lymphadenopathy noted  SKIN: No rashes or lesions    LABS:                        13.4   4.03  )-----------( 98       ( 22 Apr 2020 06:41 )             40.3     04-23    141  |  103  |  8   ----------------------------<  71  3.2<L>   |  24  |  0.74    Ca    9.1      23 Apr 2020 07:58  Phos  3.6     04-22  Mg     2.1     04-22    TPro  6.3  /  Alb  3.2<L>  /  TBili  2.0<H>  /  DBili  x   /  AST  290<H>  /  ALT  122<H>  /  AlkPhos  38<L>  04-22       cardiac markers     CAPILLARY BLOOD GLUCOSE        Cultures    RADIOLOGY & ADDITIONAL TESTS:    Imaging Personally Reviewed:  [ ] YES  [ ] NO    Consultant(s) Notes Reviewed:  [ ] YES  [ ] NO    Care Discussed with Consultants/Other Providers [ ] YES  [ ] NO
Patient is a 29y old  Male who presents with a chief complaint of Alcohol abuse, seizure. (24 Apr 2020 10:58), no pain.       OVERNIGHT EVENTS: none    MEDICATIONS  (STANDING):  folic acid 1 milliGRAM(s) Oral daily  heparin   Injectable 5000 Unit(s) SubCutaneous every 12 hours  lactated ringers. 1000 milliLiter(s) (125 mL/Hr) IV Continuous <Continuous>  multivitamin 1 Tablet(s) Oral daily  pantoprazole    Tablet 40 milliGRAM(s) Oral before breakfast  sodium chloride 0.9%. 1000 milliLiter(s) (1000 mL/Hr) IV Continuous <Continuous>  thiamine 100 milliGRAM(s) Oral daily    MEDICATIONS  (PRN):  acetaminophen   Tablet .. 650 milliGRAM(s) Oral every 6 hours PRN Temp greater or equal to 38C (100.4F), Mild Pain (1 - 3)  LORazepam     Tablet 1 milliGRAM(s) Oral every 6 hours PRN Anxiety        REVIEW OF SYSTEMS:  CONSTITUTIONAL: No fever, weight loss, or fatigue  EYES: No eye pain, visual disturbances, or discharge  ENMT:  No difficulty hearing, tinnitus, vertigo; No sinus or throat pain  NECK: No pain or stiffness  RESPIRATORY: No cough, wheezing, chills or hemoptysis; No shortness of breath  CARDIOVASCULAR: No chest pain, palpitations, dizziness, or leg swelling  GASTROINTESTINAL: No abdominal or epigastric pain. No nausea, vomiting, or hematemesis; No diarrhea or constipation. No melena or hematochezia.  GENITOURINARY: No dysuria, frequency, hematuria, or incontinence  NEUROLOGICAL: No headaches, memory loss, loss of strength, numbness, or tremors  SKIN: No itching, burning, rashes, or lesions      Vital Signs Last 24 Hrs  T(C): 37 (24 Apr 2020 10:41), Max: 37 (24 Apr 2020 10:41)  T(F): 98.6 (24 Apr 2020 10:41), Max: 98.6 (24 Apr 2020 10:41)  HR: 64 (24 Apr 2020 10:41) (58 - 73)  BP: 130/65 (24 Apr 2020 10:41) (130/65 - 143/87)  BP(mean): --  RR: 18 (24 Apr 2020 10:41) (18 - 18)  SpO2: 97% (24 Apr 2020 10:41) (97% - 99%)    PHYSICAL EXAM:  GENERAL: NAD, well-groomed, well-developed  HEAD:  Atraumatic, Normocephalic  EYES: EOMI, PERRLA, conjunctiva and sclera clear  ENMT: No tonsillar erythema, exudates, or enlargement; Moist mucous membranes   NECK: Supple, No JVD   NERVOUS SYSTEM:  Alert & Oriented X3, Good concentration; Motor Strength 5/5 B/L upper and lower extremities; DTRs 2+ intact and symmetric  CHEST/LUNG: Clear to auscultation  bilaterally; No rales, rhonchi, wheezing, or rubs  HEART: Regular rate and rhythm; No murmurs, rubs, or gallops  ABDOMEN: Soft, Nontender, Nondistended; Bowel sounds present  EXTREMITIES:  2+ Peripheral Pulses, No clubbing, cyanosis, or edema  LYMPH: No lymphadenopathy noted  SKIN: No rashes or lesions    LABS:    04-23    143  |  105  |  7   ----------------------------<  90  3.8   |  26  |  0.83    Ca    8.9      23 Apr 2020 15:21         cardiac markers     CAPILLARY BLOOD GLUCOSE        Cultures    RADIOLOGY & ADDITIONAL TESTS:    Imaging Personally Reviewed:  [ ] YES  [ ] NO    Consultant(s) Notes Reviewed:  [ ] YES  [ ] NO    Care Discussed with Consultants/Other Providers [ ] YES  [ ] NO
Patient is a 29y old  Male who presents with a chief complaint of Alcohol abuse, seizure. (22 Apr 2020 12:07), has no pain, no SOB       OVERNIGHT EVENTS: none    MEDICATIONS  (STANDING):  folic acid 1 milliGRAM(s) Oral daily  heparin   Injectable 5000 Unit(s) SubCutaneous every 12 hours  lactated ringers. 1000 milliLiter(s) (125 mL/Hr) IV Continuous <Continuous>  LORazepam   Injectable 1.5 milliGRAM(s) IV Push every 6 hours  LORazepam   Injectable   IV Push   multivitamin 1 Tablet(s) Oral daily  pantoprazole    Tablet 40 milliGRAM(s) Oral before breakfast  thiamine 100 milliGRAM(s) Oral daily    MEDICATIONS  (PRN):  acetaminophen   Tablet .. 650 milliGRAM(s) Oral every 6 hours PRN Temp greater or equal to 38C (100.4F), Mild Pain (1 - 3)        REVIEW OF SYSTEMS:  CONSTITUTIONAL: No fever, weight loss, or fatigue  EYES: No eye pain, visual disturbances, or discharge  ENMT:  No difficulty hearing, tinnitus, vertigo; No sinus or throat pain  NECK: No pain or stiffness  RESPIRATORY: No cough, wheezing, chills or hemoptysis; No shortness of breath  CARDIOVASCULAR: No chest pain, palpitations, dizziness, or leg swelling  GASTROINTESTINAL: No abdominal or epigastric pain. No nausea, vomiting, or hematemesis; No diarrhea or constipation. No melena or hematochezia.  GENITOURINARY: No dysuria, frequency, hematuria, or incontinence  NEUROLOGICAL: No headaches, memory loss, loss of strength, numbness, or tremors  SKIN: No itching, burning, rashes, or lesions      Vital Signs Last 24 Hrs  T(C): 37.6 (22 Apr 2020 12:07), Max: 38 (21 Apr 2020 16:44)  T(F): 99.7 (22 Apr 2020 12:07), Max: 100.4 (21 Apr 2020 16:44)  HR: 102 (22 Apr 2020 12:07) (86 - 102)  BP: 138/93 (22 Apr 2020 12:07) (137/88 - 142/95)  BP(mean): --  RR: 18 (22 Apr 2020 12:07) (18 - 18)  SpO2: 97% (22 Apr 2020 12:07) (97% - 99%)    PHYSICAL EXAM:  GENERAL: NAD, well-groomed, well-developed  HEAD:  Atraumatic, Normocephalic  EYES: EOMI, PERRLA, conjunctiva and sclera clear  ENMT: No tonsillar erythema, exudates, or enlargement; Moist mucous membranes   NECK: Supple, No JVD   NERVOUS SYSTEM:  Alert & Oriented X3, Good concentration; Motor Strength 5/5 B/L upper and lower extremities; DTRs 2+ intact and symmetric  CHEST/LUNG: Clear to auscultation  bilaterally; No rales, rhonchi, wheezing, or rubs  HEART: Regular rate and rhythm; No murmurs, rubs, or gallops  ABDOMEN: Soft, Nontender, Nondistended; Bowel sounds present  EXTREMITIES:  2+ Peripheral Pulses, No clubbing, cyanosis, or edema  LYMPH: No lymphadenopathy noted  SKIN: No rashes or lesions    LABS:                        13.4   4.03  )-----------( 98       ( 22 Apr 2020 06:41 )             40.3     04-22    142  |  104  |  8   ----------------------------<  69<L>  3.0<L>   |  24  |  0.87    Ca    8.6      22 Apr 2020 06:41  Phos  3.6     04-22  Mg     2.1     04-22    TPro  6.3  /  Alb  3.2<L>  /  TBili  2.0<H>  /  DBili  x   /  AST  290<H>  /  ALT  122<H>  /  AlkPhos  38<L>  04-22       cardiac markers     CAPILLARY BLOOD GLUCOSE        Cultures    RADIOLOGY & ADDITIONAL TESTS:    Imaging Personally Reviewed:  [ ] YES  [ ] NO    Consultant(s) Notes Reviewed:  [ ] YES  [ ] NO    Care Discussed with Consultants/Other Providers [ ] YES  [ ] NO
Patient is a 29y old  Male who presents with a chief complaint of Alcohol abuse, seizure. (21 Apr 2020 13:25)      OVERNIGHT EVENTS: none   in bed with mild tremors in fingers      REVIEW OF SYSTEMS: denies chest pain/SOB, diaphoresis, no F/C, cough, dizziness, headache, blurry vision, nausea, vomiting, abdominal pain. Rest unremarkable     MEDICATIONS  (STANDING):  heparin   Injectable 5000 Unit(s) SubCutaneous every 12 hours  lactated ringers. 1000 milliLiter(s) (125 mL/Hr) IV Continuous <Continuous>  LORazepam   Injectable 2 milliGRAM(s) IV Push every 4 hours  LORazepam   Injectable   IV Push   pantoprazole    Tablet 40 milliGRAM(s) Oral before breakfast  potassium chloride    Tablet ER 40 milliEquivalent(s) Oral every 4 hours    MEDICATIONS  (PRN):  acetaminophen   Tablet .. 650 milliGRAM(s) Oral every 6 hours PRN Temp greater or equal to 38C (100.4F), Mild Pain (1 - 3)      Allergies    No Known Allergies    Intolerances        SUBJECTIVE: in bed in NAD, no acute events overnight     T(F): 99.6 (04-21-20 @ 10:54), Max: 101 (04-20-20 @ 23:57)  HR: 100 (04-21-20 @ 10:54) (100 - 135)  BP: 152/105 (04-21-20 @ 10:54) (136/82 - 153/87)  RR: 16 (04-21-20 @ 05:24) (16 - 20)  SpO2: 98% (04-21-20 @ 10:54) (93% - 100%)  Wt(kg): --    PHYSICAL EXAM:  GENERAL: NAD, well-groomed, well-developed  HEAD:  Atraumatic, Normocephalic  EYES: EOMI, PERRLA, conjunctiva and sclera clear  ENMT: No tonsillar erythema, exudates, or enlargement; Moist mucous membranes, Good dentition, No lesions  NECK: Supple, No JVD, Normal thyroid  CHEST/LUNG: Clear to  auscultation bilaterally; No rales, rhonchi, wheezing, or rubs  bilaterally  HEART: Regular rate and rhythm; No murmurs, rubs, or gallops  ABDOMEN: Soft, Nontender, Nondistended; Bowel sounds present  EXTREMITIES:  2+ Peripheral Pulses, No clubbing, cyanosis, or edema BL LE  SKIN: No rashes or lesions  NERVOUS SYSTEM:  Alert & Oriented X3, Good concentration; Motor Strength 5/5 B/L upper and lower extremities; tremors noted in fingers  DTRs 2+ intact and symmetric, sensation intact BL    LABS:                        14.9   6.94  )-----------( 135      ( 20 Apr 2020 17:48 )             43.5     04-21    141  |  103  |  9   ----------------------------<  75  2.8<LL>   |  25  |  1.07    Ca    9.0      21 Apr 2020 10:46  Phos  2.8     04-21  Mg     2.6     04-21    TPro  7.5  /  Alb  3.6  /  TBili  2.4<H>  /  DBili  .73<H>  /  AST  307<H>  /  ALT  157<H>  /  AlkPhos  47  04-21        Cultures;   CAPILLARY BLOOD GLUCOSE      POCT Blood Glucose.: 127 mg/dL (20 Apr 2020 17:18)    Lipid panel:           RADIOLOGY & ADDITIONAL TESTS:      Imaging Personally Reviewed:  [ x] YES      Consultant(s) Notes Reviewed:  [x ] YES     Care Discussed with [x ] Consultants [X ] Patient [x ] Family  [x ]    [x ]  Other; RN

## 2020-04-24 NOTE — PROGRESS NOTE ADULT - ASSESSMENT
30 y/o male with no sig pmh presents complaining of seizures. States that he had 4 seizures today. First seizure was at 3am, went to urgent care where they said they could not help him and then went home where he had more seizures. When EMS arrived, pt was still post ictal. As per patient, he has never had seizures in the past and has never withdrawn from ETOH. Patient states he has been drinking more because of covid. Reports last drink 2 days ago. Denies hallucinations, dizziness, n/v/d, abdominal pain, focal weakness, SI and other associated sx. Pt with likely alcohol withdrawal seizures; Dr JOSE Santos, neurology, notified by ED.  IMPROVE VTE Individual Risk Assessment          RISK                                                          Points    [  ] Previous VTE                                                3    [  ] Thrombophilia                                             2    [  ] Lower limb paralysis                                    2        (unable to hold up >15 seconds)      [  ] Current Cancer                                             2         (within 6 months)    [  ] Immobilization > 24 hrs                              1    [  ] ICU/CCU stay > 24 hours                            1    [  ] Age > 60                                                    1    IMPROVE VTE Score ___0______
28 y/o male with no sig pmh presents complaining of seizures. States that he had 4 seizures today. First seizure was at 3am, went to urgent care where they said they could not help him and then went home where he had more seizures. When EMS arrived, pt was still post ictal. As per patient, he has never had seizures in the past and has never withdrawn from ETOH. Patient states he has been drinking more because of covid. Reports last drink 2 days ago. Denies hallucinations, dizziness, n/v/d, abdominal pain, focal weakness, SI and other associated sx. Pt with likely alcohol withdrawal seizures; Dr JOSE Santos, neurology, notified by ED.  IMPROVE VTE Individual Risk Assessment          RISK                                                          Points    [  ] Previous VTE                                                3    [  ] Thrombophilia                                             2    [  ] Lower limb paralysis                                    2        (unable to hold up >15 seconds)      [  ] Current Cancer                                             2         (within 6 months)    [  ] Immobilization > 24 hrs                              1    [  ] ICU/CCU stay > 24 hours                            1    [  ] Age > 60                                                    1    IMPROVE VTE Score ___0______

## 2020-04-24 NOTE — PROGRESS NOTE ADULT - PROBLEM SELECTOR PROBLEM 2
Hypokalemia
Hypokalemia
Alcohol withdrawal syndrome without complication
Alcohol withdrawal syndrome without complication

## 2020-04-24 NOTE — PROGRESS NOTE ADULT - PROBLEM SELECTOR PLAN 1
-IV fluids, CIWA protocol,    -cont thiamine, folic acid and mvi  - Ativan as needed.  - EEG was normal, seizure was ruled out by neurology
-IV fluids, CIWA protocol,    -cont thiamine, folic acid and mvi  - Ativan as needed.  - EEG was normal, seizure was ruled out by neurology.  - hold discharge for now for tachycardia, likely secondary to dehydration.  - IVF bolus 1L ordered.
- seizure precautions, benzodiazepines,   - Neurology is following.  - presumed due to etoh
seizure precautions, benzodiazepines, neurology consult noted no aeds   presumed due to etoh

## 2020-04-24 NOTE — PROGRESS NOTE ADULT - PROBLEM SELECTOR PROBLEM 1
Seizure
Alcohol withdrawal syndrome without complication
Seizure
Alcohol withdrawal syndrome without complication

## 2020-04-24 NOTE — DISCHARGE NOTE PROVIDER - NSDCCPCAREPLAN_GEN_ALL_CORE_FT
PRINCIPAL DISCHARGE DIAGNOSIS  Diagnosis: Alcohol withdrawal syndrome without complication  Assessment and Plan of Treatment:       SECONDARY DISCHARGE DIAGNOSES  Diagnosis: Hypokalemia  Assessment and Plan of Treatment:

## 2020-04-24 NOTE — PROGRESS NOTE ADULT - REASON FOR ADMISSION
Alcohol abuse, seizure.

## 2020-04-25 VITALS
DIASTOLIC BLOOD PRESSURE: 87 MMHG | HEART RATE: 70 BPM | RESPIRATION RATE: 18 BRPM | TEMPERATURE: 99 F | SYSTOLIC BLOOD PRESSURE: 143 MMHG | OXYGEN SATURATION: 100 %

## 2020-04-25 LAB
T4 AB SER-ACNC: 7.8 UG/DL — SIGNIFICANT CHANGE UP (ref 4.6–12)
TSH SERPL-MCNC: 3.2 UIU/ML — SIGNIFICANT CHANGE UP (ref 0.36–3.74)

## 2020-04-25 PROCEDURE — 99239 HOSP IP/OBS DSCHRG MGMT >30: CPT

## 2020-04-25 RX ORDER — THIAMINE MONONITRATE (VIT B1) 100 MG
1 TABLET ORAL
Qty: 30 | Refills: 0
Start: 2020-04-25 | End: 2020-05-24

## 2020-04-25 RX ORDER — FOLIC ACID 0.8 MG
1 TABLET ORAL
Qty: 30 | Refills: 0
Start: 2020-04-25 | End: 2020-05-24

## 2020-04-25 RX ADMIN — Medication 1 MILLIGRAM(S): at 10:49

## 2020-04-25 RX ADMIN — Medication 100 MILLIGRAM(S): at 10:49

## 2020-04-25 RX ADMIN — Medication 1 TABLET(S): at 10:49

## 2020-04-25 RX ADMIN — SODIUM CHLORIDE 125 MILLILITER(S): 9 INJECTION, SOLUTION INTRAVENOUS at 09:34

## 2020-04-25 RX ADMIN — HEPARIN SODIUM 5000 UNIT(S): 5000 INJECTION INTRAVENOUS; SUBCUTANEOUS at 05:15

## 2020-04-25 RX ADMIN — PANTOPRAZOLE SODIUM 40 MILLIGRAM(S): 20 TABLET, DELAYED RELEASE ORAL at 05:15

## 2020-04-25 RX ADMIN — SODIUM CHLORIDE 125 MILLILITER(S): 9 INJECTION, SOLUTION INTRAVENOUS at 02:23

## 2020-04-25 NOTE — DISCHARGE NOTE NURSING/CASE MANAGEMENT/SOCIAL WORK - PATIENT PORTAL LINK FT
You can access the FollowMyHealth Patient Portal offered by [facility] by registering at the following website: [email or web address]. By joining [employer]’s FollowMyHealth portal, you will also be able to view your health information using other applications (apps) compatible with our system.

## 2020-04-28 DIAGNOSIS — E86.0 DEHYDRATION: ICD-10-CM

## 2020-04-28 DIAGNOSIS — G40.89 OTHER SEIZURES: ICD-10-CM

## 2020-04-28 DIAGNOSIS — R56.9 UNSPECIFIED CONVULSIONS: ICD-10-CM

## 2020-04-28 DIAGNOSIS — E83.39 OTHER DISORDERS OF PHOSPHORUS METABOLISM: ICD-10-CM

## 2020-04-28 DIAGNOSIS — R00.0 TACHYCARDIA, UNSPECIFIED: ICD-10-CM

## 2020-04-28 DIAGNOSIS — F10.231 ALCOHOL DEPENDENCE WITH WITHDRAWAL DELIRIUM: ICD-10-CM

## 2020-04-28 DIAGNOSIS — E87.6 HYPOKALEMIA: ICD-10-CM
